# Patient Record
Sex: MALE | Race: WHITE | NOT HISPANIC OR LATINO | URBAN - METROPOLITAN AREA
[De-identification: names, ages, dates, MRNs, and addresses within clinical notes are randomized per-mention and may not be internally consistent; named-entity substitution may affect disease eponyms.]

---

## 2018-06-21 ENCOUNTER — TESTING ONLY (OUTPATIENT)
Dept: URBAN - METROPOLITAN AREA CLINIC 16 | Facility: CLINIC | Age: 78
End: 2018-06-21

## 2018-06-21 DIAGNOSIS — H50.9 STRABISMUS: Primary | ICD-10-CM

## 2018-06-21 PROCEDURE — V2799 MISC VISION ITEM OR SERVICE: HCPCS | Performed by: OPTOMETRIST

## 2018-06-21 ASSESSMENT — VISUAL ACUITY
OS: 20/30
OD: 20/40

## 2019-07-08 ENCOUNTER — OFFICE VISIT (OUTPATIENT)
Dept: URBAN - METROPOLITAN AREA CLINIC 16 | Facility: CLINIC | Age: 79
End: 2019-07-08
Payer: MEDICARE

## 2019-07-08 DIAGNOSIS — H52.223 REGULAR ASTIGMATISM, BILATERAL: ICD-10-CM

## 2019-07-08 DIAGNOSIS — Z96.1 PRESENCE OF INTRAOCULAR LENS: ICD-10-CM

## 2019-07-08 DIAGNOSIS — E11.9 TYPE 2 DIABETES MELLITUS WITHOUT COMPLICATIONS: Primary | ICD-10-CM

## 2019-07-08 PROCEDURE — 92014 COMPRE OPH EXAM EST PT 1/>: CPT | Performed by: OPTOMETRIST

## 2019-07-08 ASSESSMENT — KERATOMETRY
OS: 42.63
OD: 43.00

## 2019-07-08 ASSESSMENT — INTRAOCULAR PRESSURE
OD: 16
OS: 16

## 2019-07-08 ASSESSMENT — VISUAL ACUITY
OD: 20/30
OS: 20/30

## 2019-07-08 NOTE — IMPRESSION/PLAN
Impression: Type 2 diabetes mellitus without complications: X27.4. Plan: Diabetes type II: no background retinopathy, no signs of neovascularization or macular edema noted. Discussed ocular and systemic benefits of blood sugar control.

## 2020-08-06 ENCOUNTER — OFFICE VISIT (OUTPATIENT)
Dept: URBAN - METROPOLITAN AREA CLINIC 16 | Facility: CLINIC | Age: 80
End: 2020-08-06
Payer: MEDICARE

## 2020-08-06 DIAGNOSIS — H52.4 PRESBYOPIA: ICD-10-CM

## 2020-08-06 PROCEDURE — 92014 COMPRE OPH EXAM EST PT 1/>: CPT | Performed by: OPTOMETRIST

## 2020-08-06 ASSESSMENT — INTRAOCULAR PRESSURE
OS: 14
OD: 15

## 2020-08-06 ASSESSMENT — KERATOMETRY
OS: 42.75
OD: 42.75

## 2020-08-06 ASSESSMENT — VISUAL ACUITY
OS: 20/30
OD: 20/30

## 2020-08-06 NOTE — IMPRESSION/PLAN
Impression: Type 2 diabetes mellitus without complications: R29.9. Plan: Diabetes type II: no background retinopathy, no signs of neovascularization or macular edema noted. Discussed ocular and systemic benefits of blood sugar control.